# Patient Record
Sex: MALE | Race: WHITE | NOT HISPANIC OR LATINO | Employment: OTHER | URBAN - METROPOLITAN AREA
[De-identification: names, ages, dates, MRNs, and addresses within clinical notes are randomized per-mention and may not be internally consistent; named-entity substitution may affect disease eponyms.]

---

## 2020-10-20 NOTE — PROGRESS NOTES
[facility] Cardiology  [address], [address]  [place], [place], [address]    [phone]  WWW.[facility]           OUTPATIENT CLINIC CONSULTATION NOTE    Patient care team:  Patient Care Team:  Ronald Parker MD as PCP - General (Family Medicine)    Requesting Provider and Reason for consultation: The patient is being seen today at the request of Ronald Parker MD for atypical chest pain.     Subjective:   Chief complaint:   Chief Complaint   Patient presents with   • Chest Pain       HPI:    Masood Roberts is a 50 y.o. male.      Partial problem list, including cardiac problems:  1. GERD  2. Kidney stones  3. Bowel obstruction      He presents today for consultation.      He reports that over the past 6 months he has had approximately 4 episodes of chest pain with radiation in between his shoulder blades.  These typically last minutes before resolving spontaneously.  They are sharp in nature.  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  He does not notice these chest pains with exertion.  They typically occur at rest.  He also reports exertional lightheadedness intermittently and occasional dyspnea with exertion.  He denies syncope, palpitations, orthopnea, or PND.  He has previously been evaluated by Dr. Hernandez in 2015 and Dr. De Luna in 2012.  In 2012 he had a reportedly normal stress test and unremarkable Holter monitor.  He reports his acid reflux has recently been stable, but he has been on multiple antacids in the past.    He drinks 1-2 beers daily.  He denies tobacco or drug use.  He denies any familial history of CAD.    Review of Systems:  Positive for chest pain, lightheadedness, dyspnea  Negative for exertional chest pain,  orthopnea, PND, lower extremity edema, palpitations,syncope.  All other systems reviewed and are negative.    PFSH:  Patient Active Problem List   Diagnosis   • Chest pain       No current outpatient medications on file.    No Known  "Allergies    Social History     Socioeconomic History   • Marital status:      Spouse name: Not on file   • Number of children: Not on file   • Years of education: Not on file   • Highest education level: Not on file   Tobacco Use   • Smoking status: Never Smoker   • Smokeless tobacco: Never Used   Substance and Sexual Activity   • Alcohol use: Yes   • Drug use: Never     Family History   Problem Relation Age of Onset   • Melanoma Father    • Lung cancer Paternal Grandfather          Objective:   Physical Exam:  /90 (BP Location: Right arm, Patient Position: Sitting)   Pulse 72   Temp 97.5 °F (36.4 °C)   Ht 170.2 cm (67\")   Wt 73.5 kg (162 lb)   BMI 25.37 kg/m²    CONSTITUTIONAL: Well-nourished. In no acute distress.   SKIN: Warm and dry. No rashes noted  HEENT: Head is normocephalic and atraumatic. Pupils are equal and reactive to light bilaterally. Mucous membranes are pink and moist.   NECK: Supple without masses or thyromegaly. There is no jugular venous distention   LUNGS: Normal effort. Clear to auscultation bilaterally without wheezing, rhonchi, or rales noted.   CARDIOVASCULAR: Regular rate with a normal S1 and S2. There is no murmur, gallop, rub, or click appreciated. Carotid upstrokes are 2+ and symmetrical without bruits. There is no peripheral edema.  Posterior tibial pulses 2+ bilaterally.  ABDOMEN: Normal bowel sounds.  Soft and nondistended. No tenderness with palpitation.   MUSCULOSKELETAL:  No digital cyanosis  NEUROLOGICAL: Nonfocal.  PSYCHIATRIC: Alert, orientated x 3, appropriate affect     Labs:  No results found for: BUN, CREATININE, K, ALT, AST  No results found for: WBC, HGB, HCT, PLT    No results found for: CHOL  No results found for: TRIG  No results found for: HDL  No results found for: LDL  No components found for: LDLDIRECTC    Diagnostic Data:      ECG 12 Lead    Date/Time: 10/21/2020 1:25 PM  Performed by: Christo Heard MD  Authorized by: Christo Heard MD "   Comparison: not compared with previous ECG   Previous ECG: no previous ECG available  Rhythm: sinus rhythm  Rate: normal  BPM: 72  Comments: QRS 87 ms    ms                   Assessment and Plan:   Diagnoses and all orders for this visit:    1. Chest pain  -Chest pain is atypical in nature for angina.  -Treadmill stress test to rule out evidence of stress-induced ischemia.  -Recommend cardiac exercise.  -If the above testing is unremarkable would consider other causes of chest pain.      - Return in about 1 year (around 10/21/2021) for Next scheduled follow up or on an as-needed basis.      Scribed for Christo Heard MD by Christo Heard MD   10/21/2020  16:43 EDT    I, Christo Heard MD, personally performed the services as scribed by the above named individual. I have made any necessary edits and it is both accurate and complete.     Christo Heard MD, MSc, [facility]  Interventional Cardiology  [facility]

## 2020-10-21 ENCOUNTER — CONSULT (OUTPATIENT)
Dept: CARDIOLOGY | Facility: CLINIC | Age: 50
End: 2020-10-21

## 2020-10-21 VITALS
HEIGHT: 67 IN | WEIGHT: 162 LBS | TEMPERATURE: 97.5 F | DIASTOLIC BLOOD PRESSURE: 90 MMHG | BODY MASS INDEX: 25.43 KG/M2 | SYSTOLIC BLOOD PRESSURE: 120 MMHG | HEART RATE: 72 BPM

## 2020-10-21 DIAGNOSIS — R07.9 CHEST PAIN, UNSPECIFIED TYPE: Primary | ICD-10-CM

## 2020-10-21 PROCEDURE — 93000 ELECTROCARDIOGRAM COMPLETE: CPT | Performed by: INTERNAL MEDICINE

## 2020-10-21 PROCEDURE — 99243 OFF/OP CNSLTJ NEW/EST LOW 30: CPT | Performed by: INTERNAL MEDICINE

## 2020-12-09 ENCOUNTER — HOSPITAL ENCOUNTER (OUTPATIENT)
Dept: CARDIOLOGY | Facility: HOSPITAL | Age: 50
Discharge: HOME | End: 2020-12-09
Admitting: NURSE PRACTITIONER

## 2020-12-09 VITALS
WEIGHT: 160 LBS | BODY MASS INDEX: 25.11 KG/M2 | HEIGHT: 67 IN | SYSTOLIC BLOOD PRESSURE: 130 MMHG | HEART RATE: 76 BPM | RESPIRATION RATE: 18 BRPM | DIASTOLIC BLOOD PRESSURE: 90 MMHG

## 2020-12-09 DIAGNOSIS — R07.9 CHEST PAIN, UNSPECIFIED TYPE: ICD-10-CM

## 2020-12-09 LAB
BH CV STRESS BP STAGE 1: NORMAL
BH CV STRESS BP STAGE 2: NORMAL
BH CV STRESS BP STAGE 3: NORMAL
BH CV STRESS BP STAGE 4: NORMAL
BH CV STRESS DURATION MIN STAGE 1: 3
BH CV STRESS DURATION MIN STAGE 2: 3
BH CV STRESS DURATION MIN STAGE 3: 3
BH CV STRESS DURATION MIN STAGE 4: 2
BH CV STRESS DURATION SEC STAGE 1: 0
BH CV STRESS DURATION SEC STAGE 2: 0
BH CV STRESS DURATION SEC STAGE 3: 0
BH CV STRESS DURATION SEC STAGE 4: 59
BH CV STRESS GRADE STAGE 1: 10
BH CV STRESS GRADE STAGE 2: 12
BH CV STRESS GRADE STAGE 3: 14
BH CV STRESS GRADE STAGE 4: 16
BH CV STRESS HR STAGE 1: 109
BH CV STRESS HR STAGE 2: 125
BH CV STRESS HR STAGE 3: 137
BH CV STRESS HR STAGE 4: 169
BH CV STRESS METS STAGE 1: 5
BH CV STRESS METS STAGE 2: 7.5
BH CV STRESS METS STAGE 3: 10
BH CV STRESS METS STAGE 4: 13.5
BH CV STRESS O2 STAGE 1: 98
BH CV STRESS O2 STAGE 2: 98
BH CV STRESS O2 STAGE 3: 97
BH CV STRESS O2 STAGE 4: 97
BH CV STRESS PROTOCOL 1: NORMAL
BH CV STRESS RECOVERY BP: NORMAL MMHG
BH CV STRESS RECOVERY HR: 91 BPM
BH CV STRESS RECOVERY O2: 98 %
BH CV STRESS SPEED STAGE 1: 1.7
BH CV STRESS SPEED STAGE 2: 2.5
BH CV STRESS SPEED STAGE 3: 3.4
BH CV STRESS SPEED STAGE 4: 4.2
BH CV STRESS STAGE 1: 1
BH CV STRESS STAGE 2: 2
BH CV STRESS STAGE 3: 3
BH CV STRESS STAGE 4: 4
MAXIMAL PREDICTED HEART RATE: 169 BPM
PERCENT MAX PREDICTED HR: 100 %
STRESS BASELINE BP: NORMAL MMHG
STRESS BASELINE HR: 87 BPM
STRESS O2 SAT REST: 98 %
STRESS PERCENT HR: 118 %
STRESS POST ESTIMATED WORKLOAD: 13.4 METS
STRESS POST EXERCISE DUR MIN: 11 MIN
STRESS POST EXERCISE DUR SEC: 59 SEC
STRESS POST O2 SAT PEAK: 97 %
STRESS POST PEAK BP: NORMAL MMHG
STRESS POST PEAK HR: 169 BPM
STRESS TARGET HR: 144 BPM

## 2020-12-09 PROCEDURE — 93018 CV STRESS TEST I&R ONLY: CPT | Performed by: INTERNAL MEDICINE

## 2020-12-09 PROCEDURE — 93017 CV STRESS TEST TRACING ONLY: CPT

## 2020-12-17 ENCOUNTER — TELEPHONE (OUTPATIENT)
Dept: CARDIOLOGY | Facility: CLINIC | Age: 50
End: 2020-12-17

## 2020-12-17 NOTE — TELEPHONE ENCOUNTER
----- Message from PAMELA Espinosa sent at 12/15/2020 11:40 AM EST -----  Can you let the patient know that his stress test did not show evidence of ischemia.  He should follow up with his PCP to look for other noncardiac causes of chest pain.

## 2021-11-03 ENCOUNTER — OFFICE VISIT (OUTPATIENT)
Dept: CARDIOLOGY | Facility: CLINIC | Age: 51
End: 2021-11-03

## 2021-11-03 VITALS
HEIGHT: 67 IN | WEIGHT: 164 LBS | SYSTOLIC BLOOD PRESSURE: 118 MMHG | HEART RATE: 76 BPM | BODY MASS INDEX: 25.74 KG/M2 | OXYGEN SATURATION: 98 % | DIASTOLIC BLOOD PRESSURE: 82 MMHG

## 2021-11-03 DIAGNOSIS — R07.9 CHEST PAIN, UNSPECIFIED TYPE: Primary | ICD-10-CM

## 2021-11-03 PROCEDURE — 99213 OFFICE O/P EST LOW 20 MIN: CPT | Performed by: INTERNAL MEDICINE

## 2021-11-03 PROCEDURE — 93000 ELECTROCARDIOGRAM COMPLETE: CPT | Performed by: INTERNAL MEDICINE

## 2021-11-03 NOTE — PROGRESS NOTES
[facility] Cardiology  [address], [address]  [place], [place], [address]    [phone]  WWW.[facility]           OUTPATIENT CLINIC NOTE    Patient care team:  Patient Care Team:  Ronald Parker MD as PCP - General (Family Medicine)      Subjective:   Chief complaint:   Chief Complaint   Patient presents with   • Chest Pain     follow up       HPI:    Masood Roberts is a 51 y.o. male.  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 Atypical chest pain  a. Evaluated by Dr De Luna in 2012, negative stress and Holter  b. Atypical pains in 2020, negative GXT 12/2020  2. GERD  a. Has been on various antacids in the past  3. Kidney stones  4. Bowel obstruction      He presents today for follow up.      Since his last visit the patient had not had recurrent chest pain up until 2 weeks ago.  Since then he has had 2 episodes of chest pain that are similar to what he felt in the past.  They are sharp in nature, centrally located, lasting 5 to 6 minutes.  They occurred at rest.  They do not occur with his strenuous activities.    Prior to those chest pains, for couple days he had significant reflux-like symptoms.  Tums tends to help his reflux.  Has a history of GERD    He denies tobacco or drug use.  He denies any familial history of CAD.    Review of Systems:  Positive for atypical chest pain, reflux  Negative for exertional chest pain, lower extremity edema, palpitations,syncope.    PFSH:  Patient Active Problem List   Diagnosis   • Chest pain       No current outpatient medications on file.    No Known Allergies    Social History     Socioeconomic History   • Marital status:    Tobacco Use   • Smoking status: Never Smoker   • Smokeless tobacco: Never Used   Vaping Use   • Vaping Use: Never used   Substance and Sexual Activity   • Alcohol use: Yes   • Drug use: Never   • Sexual activity: Defer     Family History  "  Problem Relation Age of Onset   • Melanoma Father    • Lung cancer Paternal Grandfather          Objective:   Physical Exam:  /82 (BP Location: Right arm, Patient Position: Sitting)   Pulse 76   Ht 170.2 cm (67\")   Wt 74.4 kg (164 lb)   SpO2 98%   BMI 25.69 kg/m²   CONSTITUTIONAL: Well-nourished. In no acute distress.   LUNGS: Normal effort. Clear to auscultation bilaterally without wheezing, rhonchi, or rales noted.   CARDIOVASCULAR: Regular rate with a normal S1 and S2. There is no murmur, gallop, rub, or click appreciated.     10/2020 exam: Posterior tibial pulses 2+ bilaterally. No carotid bruit      Labs:  No results found for: BUN, CREATININE, K, ALT, AST  No results found for: WBC, HGB, HCT, PLT    No results found for: CHOL  No results found for: TRIG  No results found for: HDL  No results found for: LDL  No components found for: LDLDIRECTC    Diagnostic Data:      ECG 12 Lead    Date/Time: 11/3/2021 2:50 PM  Performed by: Christo Heard MD  Authorized by: Christo Heard MD   Comparison: compared with previous ECG from 10/21/2020  Rhythm: sinus rhythm            GXT 12/2020  · The exercise ECG stress test was negative for clinical and ECG evidence of myocardial ischemia. The Duke treadmill score was 12.0. There was a hypertensive response to stress.  · Impressions are consistent with a low risk stress test.          Assessment and Plan:     Chest pain  GERD  -Atypical chest pain for angina.  Isolated episodes.  Active without reproducible chest pain.  -As such, would defer on additional cardiac testing.  -Advised the patient to let us know if his chest pains recur with exertional activities.  Would consider an exercise stress echo at that time.  -Since the symptoms were temporally related to reflux-like symptoms, recommend further evaluation and possible treatment for GERD versus other gastric issues such as H. pylori  -Continue Tums as needed  -Discussed considering daily Pepcid  -Recommended " he talk to his primary care team about his stomach issues      - Return in about 1 year (around 11/3/2022) for Next scheduled follow up, or sooner if needed.          Christo Heard MD, MSc, [facility]  Interventional Cardiology  [facility]
